# Patient Record
Sex: MALE | Race: WHITE | Employment: FULL TIME | URBAN - NONMETROPOLITAN AREA
[De-identification: names, ages, dates, MRNs, and addresses within clinical notes are randomized per-mention and may not be internally consistent; named-entity substitution may affect disease eponyms.]

---

## 2024-01-31 ENCOUNTER — OFFICE VISIT (OUTPATIENT)
Dept: ORTHOPEDIC SURGERY | Age: 54
End: 2024-01-31
Payer: COMMERCIAL

## 2024-01-31 VITALS — WEIGHT: 229 LBS | BODY MASS INDEX: 32.06 KG/M2 | HEIGHT: 71 IN

## 2024-01-31 DIAGNOSIS — M25.562 LEFT KNEE PAIN, UNSPECIFIED CHRONICITY: Primary | ICD-10-CM

## 2024-01-31 PROCEDURE — 99204 OFFICE O/P NEW MOD 45 MIN: CPT | Performed by: ORTHOPAEDIC SURGERY

## 2024-01-31 RX ORDER — OMEPRAZOLE 40 MG/1
40 CAPSULE, DELAYED RELEASE ORAL DAILY
COMMUNITY
Start: 2023-11-28 | End: 2024-11-27

## 2024-02-13 NOTE — PROGRESS NOTES
1/31/2024     Reason for visit:  Left knee pain    History of Present Illness:  The patient is a 53-year-old male who presents for evaluation of his left knee.  He reports on and off pain for the past 2 months.  No traumatic injury associate the onset of his symptoms.  The pain is diffuse about the knee including anterior, deep, and medial.  Is made worse with standing, walking, stairs.  Occasional swelling.  No catching or locking.  He has had no formal treatment.    Medical History:  No past medical history on file.   No past surgical history on file.   No family history on file.   Social History     Socioeconomic History    Marital status: Single     Spouse name: Not on file    Number of children: Not on file    Years of education: Not on file    Highest education level: Not on file   Occupational History    Not on file   Tobacco Use    Smoking status: Never    Smokeless tobacco: Never   Substance and Sexual Activity    Alcohol use: Not on file    Drug use: Not on file    Sexual activity: Not on file   Other Topics Concern    Not on file   Social History Narrative    Not on file     Social Determinants of Health     Financial Resource Strain: Not on file   Food Insecurity: Not on file   Transportation Needs: Not on file   Physical Activity: Not on file   Stress: Not on file   Social Connections: Not on file   Intimate Partner Violence: Not on file   Housing Stability: Not on file      Current Outpatient Medications on File Prior to Visit   Medication Sig Dispense Refill    omeprazole (PRILOSEC) 40 MG delayed release capsule Take 1 capsule by mouth daily       No current facility-administered medications on file prior to visit.      Allergies   Allergen Reactions    Aminoglycosides Itching and Rash        Review of Systems:  Constitutional: Patient is adequately groomed with no evidence of malnutrition  Mental Status: The patient is oriented to time, place and person.  The patient's mood and affect are